# Patient Record
Sex: FEMALE | Race: WHITE | NOT HISPANIC OR LATINO | Employment: OTHER | URBAN - METROPOLITAN AREA
[De-identification: names, ages, dates, MRNs, and addresses within clinical notes are randomized per-mention and may not be internally consistent; named-entity substitution may affect disease eponyms.]

---

## 2017-05-01 ENCOUNTER — HOSPITAL ENCOUNTER (OUTPATIENT)
Dept: PAIN MEDICINE | Facility: HOSPITAL | Age: 57
Discharge: HOME | End: 2017-05-01
Attending: PAIN MEDICINE | Admitting: PAIN MEDICINE

## 2017-05-15 ENCOUNTER — HOSPITAL ENCOUNTER (OUTPATIENT)
Dept: PAIN MEDICINE | Facility: HOSPITAL | Age: 57
Discharge: HOME | End: 2017-05-15
Attending: PAIN MEDICINE | Admitting: PAIN MEDICINE

## 2017-06-05 ENCOUNTER — HOSPITAL ENCOUNTER (OUTPATIENT)
Dept: PAIN MEDICINE | Facility: HOSPITAL | Age: 57
Discharge: HOME | End: 2017-06-05
Attending: PAIN MEDICINE | Admitting: PAIN MEDICINE

## 2017-08-28 ENCOUNTER — HOSPITAL ENCOUNTER (OUTPATIENT)
Dept: PAIN MEDICINE | Facility: HOSPITAL | Age: 57
Discharge: HOME | End: 2017-08-28
Attending: PAIN MEDICINE | Admitting: PAIN MEDICINE

## 2017-12-22 ENCOUNTER — HOSPITAL ENCOUNTER (OUTPATIENT)
Dept: GENERAL RADIOLOGY | Facility: HOSPITAL | Age: 57
Discharge: HOME | End: 2017-12-22
Attending: NEUROLOGICAL SURGERY | Admitting: NEUROLOGICAL SURGERY

## 2018-01-29 ENCOUNTER — HOSPITAL ENCOUNTER (OUTPATIENT)
Dept: GENERAL RADIOLOGY | Facility: HOSPITAL | Age: 58
Discharge: HOME | End: 2018-01-29
Attending: NEUROLOGICAL SURGERY | Admitting: NEUROLOGICAL SURGERY

## 2018-03-14 ENCOUNTER — HOSPITAL ENCOUNTER (OUTPATIENT)
Dept: GENERAL RADIOLOGY | Facility: HOSPITAL | Age: 58
Discharge: HOME | End: 2018-03-14
Attending: NEUROLOGICAL SURGERY | Admitting: NEUROLOGICAL SURGERY

## 2018-05-16 ENCOUNTER — HOSPITAL ENCOUNTER (OUTPATIENT)
Dept: GENERAL RADIOLOGY | Facility: HOSPITAL | Age: 58
Discharge: HOME | End: 2018-05-16
Attending: NEUROLOGICAL SURGERY | Admitting: NEUROLOGICAL SURGERY

## 2021-07-19 ENCOUNTER — ON CAMPUS - OUTPATIENT (OUTPATIENT)
Dept: URBAN - METROPOLITAN AREA HOSPITAL 77 | Facility: HOSPITAL | Age: 61
End: 2021-07-19
Payer: COMMERCIAL

## 2021-07-19 DIAGNOSIS — Z12.11 ENCOUNTER FOR SCREENING FOR MALIGNANT NEOPLASM OF COLON: ICD-10-CM

## 2021-07-19 DIAGNOSIS — D12.5 BENIGN NEOPLASM OF SIGMOID COLON: ICD-10-CM

## 2021-07-19 DIAGNOSIS — K62.1 RECTAL POLYP: ICD-10-CM

## 2021-07-19 PROCEDURE — 45385 COLONOSCOPY W/LESION REMOVAL: CPT | Mod: 33 | Performed by: INTERNAL MEDICINE

## 2025-04-09 ENCOUNTER — PATIENT ROUNDING (BHMG ONLY) (OUTPATIENT)
Dept: URGENT CARE | Facility: CLINIC | Age: 65
End: 2025-04-09
Payer: COMMERCIAL

## 2025-04-09 NOTE — ED NOTES
Thank you for letting us care for you in your recent visit to our urgent care center. We would love to hear about your experience with us. Was this the first time you have visited our location?    We’re always looking for ways to make our patients’ experiences even better. Do you have any recommendations on ways we may improve?     I appreciate you taking the time to respond. Please be on the lookout for a survey about your recent visit from [employer] via text or email. We would greatly appreciate if you could fill that out and turn it back in. We want your voice to be heard and we value your feedback.   Thank you for choosing [facility] for your healthcare needs.     Stacy Practice Manager

## 2025-04-24 ENCOUNTER — OFFICE VISIT (OUTPATIENT)
Age: 65
End: 2025-04-24
Payer: COMMERCIAL

## 2025-04-24 VITALS
BODY MASS INDEX: 27.42 KG/M2 | HEART RATE: 70 BPM | OXYGEN SATURATION: 98 % | RESPIRATION RATE: 18 BRPM | HEIGHT: 62 IN | WEIGHT: 149 LBS

## 2025-04-24 DIAGNOSIS — M25.571 ACUTE RIGHT ANKLE PAIN: Primary | ICD-10-CM

## 2025-04-24 DIAGNOSIS — M25.371 ANKLE INSTABILITY, RIGHT: ICD-10-CM

## 2025-04-24 DIAGNOSIS — M20.5X1 HALLUX LIMITUS, RIGHT: ICD-10-CM

## 2025-04-24 DIAGNOSIS — S93.401A SEVERE ANKLE SPRAIN, RIGHT, INITIAL ENCOUNTER: ICD-10-CM

## 2025-04-24 RX ORDER — METHYLPREDNISOLONE 4 MG/1
TABLET ORAL
Qty: 21 TABLET | Refills: 0 | Status: SHIPPED | OUTPATIENT
Start: 2025-04-24

## 2025-05-08 ENCOUNTER — OFFICE VISIT (OUTPATIENT)
Age: 65
End: 2025-05-08
Payer: MEDICARE

## 2025-05-08 VITALS — WEIGHT: 149 LBS | HEIGHT: 62 IN | RESPIRATION RATE: 20 BRPM | BODY MASS INDEX: 27.42 KG/M2

## 2025-05-08 DIAGNOSIS — S93.401D SEVERE ANKLE SPRAIN, RIGHT, SUBSEQUENT ENCOUNTER: ICD-10-CM

## 2025-05-08 DIAGNOSIS — M20.11 HALLUX VALGUS (ACQUIRED), RIGHT FOOT: ICD-10-CM

## 2025-05-08 DIAGNOSIS — M25.571 ACUTE RIGHT ANKLE PAIN: Primary | ICD-10-CM

## 2025-05-08 DIAGNOSIS — M25.371 ANKLE INSTABILITY, RIGHT: ICD-10-CM

## 2025-05-08 NOTE — PROGRESS NOTES
05/08/2025  Foot and Ankle Surgery - Established Patient/Follow-up  Provider: Dr. Soy Root DPM  Location: [facility]    Subjective:  Katelin Farley is a 64 y.o. female.     Chief Complaint   Patient presents with    Right Ankle - Follow-up, Pain     DOI- 3/29/2025    Right Foot - Pain, Follow-up    Initial Evaluation     PCP: Skip Terrell MD  Last PCP Visit:   4/8/25       History of Present Illness  The patient presents for evaluation of ankle pain, bunion deformity, and thickened toenails.    She reports a gradual improvement in her condition, with the resolution of ankle pain. However, she experiences intermittent discomfort, particularly when wearing heeled footwear. She has been utilizing a boot for the past 10 days.    Additionally, she notes an increase in thickness of her toenails, which she has been managing by filing them down.    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    FAMILY HISTORY  Her daughter has bunions.    ALLERGIES  The patient is allergic to a lot of CHEMICALS.      Allergies   Allergen Reactions    Adhesive Tape Rash     Derma dressing after biopsy caused blisters       Current Outpatient Medications on File Prior to Visit   Medication Sig Dispense Refill    albuterol sulfate  (90 Base) MCG/ACT inhaler albuterol sulfate HFA 90 mcg/actuation aerosol inhaler   Inhale 2 puffs every 4 hours by inhalation route as needed.      betamethasone valerate (VALISONE) 0.1 % ointment Apply 1 Application topically to the appropriate area as directed Daily.      ipratropium-albuterol (DUO-NEB) 0.5-2.5 mg/3 ml nebulizer ipratropium 0.5 mg-albuterol 3 mg (2.5 mg base)/3 mL nebulization soln      PARoxetine (PAXIL) 10 MG tablet paroxetine 10 mg tablet      pramipexole (MIRAPEX) 0.5 MG tablet Take 3 tablets by mouth 3 (Three) Times a Day.      rizatriptan (MAXALT) 10 MG tablet rizatriptan 10 mg tablet      [DISCONTINUED] methylPREDNISolone (MEDROL) 4 MG  "dose pack Take as directed 21 tablet 0     No current facility-administered medications on file prior to visit.       Objective   Resp 20   Ht 157.5 cm (62\")   Wt 67.6 kg (149 lb)   BMI 27.25 kg/m²     Foot/Ankle Exam  Physical Exam  GENERAL  Appearance:  appears stated age  Orientation:  AAOx3  Affect:  appropriate     VASCULAR      Right Foot Vascularity   Dorsalis pedis:  2+  Posterior tibial:  2+  Skin temperature:  warm  Edema gradin+  CFT:  < 3 seconds  Pedal hair growth:  Absent     NEUROLOGIC      Right Foot Neurologic   Light touch sensation: normal  Hot/Cold sensation: normal  Achilles reflex:  2+     MUSCULOSKELETAL      Right Foot Musculoskeletal   Ecchymosis:  none  Tenderness:  ankle joint tenderness, lateral malleolus tenderness and peroneal tendon tenderness    Arch:  Normal  Hallux limitus: Yes       DERMATOLOGIC       Right Foot Dermatologic   Skin  Right foot skin is intact.      Right foot additional comments: Range of motion, muscle strength testing deferred secondary to guarding.  Mild bony spur noted to the dorsal aspect of the first metatarsal phalangeal joint with hallux limitus.  Moderate swelling involving the ankle with instability noted with anterior drawer and talar tilt testing.    25: Improvement noted to the right ankle with decreased discomfort.  Continued swelling about the ankle.  No prominent discomfort involving the first metatarsal phalangeal joint region.      Results  Imaging  X-ray shows a moderate bunion deformity and minimal arthritis on the top of the toe.    Assessment & Plan   Diagnoses and all orders for this visit:    1. Acute right ankle pain (Primary)  -     XR Ankle 3+ View Right    2. Severe ankle sprain, right, subsequent encounter    3. Ankle instability, right    4. Hallux valgus (acquired), right foot  -     XR Foot 3+ View Right      Assessment & Plan    The patient's ankle pain has shown improvement, but swelling and remodeling are expected to " persist. She is advised to continue with ankle range of motion exercises, including up and down circles in both directions. Supportive footwear, such as tennis shoes, is recommended to aid in the recovery process. 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 She should gradually wean off the brace over time and use good tennis shoes around the house. If she experiences more issues, she should inform the clinic.    The patient has a moderate bunion deformity causing intermittent discomfort, especially when wearing heels. The use of a compression sock or sleeve is suggested to manage daily swelling. She is encouraged to maintain ankle range of motion exercises and gradually resume her usual activities. Caution is advised when navigating uneven terrain or increasing activity levels. If the bunion becomes progressively bothersome or causes significant pain, surgical intervention may be considered. She is also advised to wear shoes with ample mesh in the toe box and good support, perform stretching exercises, and modify activities that exacerbate the condition. Arch inserts like Powersteps are recommended for additional support.    The thickening of her toenails is attributed to stress from contact with the shoe, rather than a fungal infection. She is reassured that filing down the thickened nails is acceptable.    Reviewed proper basic stretching and manual therapy exercises along with appropriate shoes and activity.  Discussed proper use and/or avoidance of OTC anti-inflammatories.  Patient is to call with any additional issues or concerns.  Greater than 20 minutes was spent before, during, and after evaluation for patient care.    The encounter note is created with the use of AI technology.  I do apologize if there are typos and/or confusion within the note.  Please feel free to contact me or my office with any questions or concerns.    Follow-up  The patient will follow  up in 4 weeks.             Patient or patient representative verbalized consent for the use of Ambient Listening during the visit with  TOBY Root DPM for chart documentation. 5/8/2025  12:51 EDT    TOBY Root DPM

## 2025-05-08 NOTE — PATIENT INSTRUCTIONS
PowerStep: [place] [employer]  - Full length insoles        Where to find:    Order online:  - [employer]    Local purchase:  - [employer]    [id number]                                                 [place] [address]

## 2025-06-05 ENCOUNTER — OFFICE VISIT (OUTPATIENT)
Age: 65
End: 2025-06-05
Payer: MEDICARE

## 2025-06-05 VITALS — WEIGHT: 149 LBS | HEIGHT: 62 IN | BODY MASS INDEX: 27.42 KG/M2 | RESPIRATION RATE: 20 BRPM

## 2025-06-05 DIAGNOSIS — M25.571 ACUTE RIGHT ANKLE PAIN: Primary | ICD-10-CM

## 2025-06-05 DIAGNOSIS — S93.401D SEVERE ANKLE SPRAIN, RIGHT, SUBSEQUENT ENCOUNTER: ICD-10-CM

## 2025-06-05 DIAGNOSIS — M20.11 HALLUX VALGUS (ACQUIRED), RIGHT FOOT: ICD-10-CM

## 2025-06-05 DIAGNOSIS — M25.371 ANKLE INSTABILITY, RIGHT: ICD-10-CM

## 2025-06-05 RX ORDER — FLUTICASONE PROPIONATE AND SALMETEROL 250; 50 UG/1; UG/1
1 POWDER RESPIRATORY (INHALATION)
COMMUNITY
Start: 2025-06-04

## 2025-06-05 RX ORDER — FLUTICASONE PROPIONATE 50 MCG
1 SPRAY, SUSPENSION (ML) NASAL DAILY
COMMUNITY
Start: 2025-06-04

## 2025-06-06 NOTE — PROGRESS NOTES
"06/05/2025  Foot and Ankle Surgery - Established Patient/Follow-up  Provider: Dr. Soy Root DPM  Location: [facility]    Subjective:  Katelin Farley is a 65 y.o. female.     Chief Complaint   Patient presents with    Right Ankle - Follow-up, Pain     DOI- 3/29/2025    Right Foot - Follow-up, Pain    Follow-up     PCP: Skip Terrell MD  Last PCP Visit: 4/15/24       History of Present Illness  The patient presents for evaluation of ankle pain.    She reports an improvement in her condition, with a noticeable reduction in swelling. She has resumed her outdoor work activities without any significant issues.      Allergies   Allergen Reactions    Adhesive Tape Rash     Derma dressing after biopsy caused blisters       Current Outpatient Medications on File Prior to Visit   Medication Sig Dispense Refill    albuterol sulfate  (90 Base) MCG/ACT inhaler albuterol sulfate HFA 90 mcg/actuation aerosol inhaler   Inhale 2 puffs every 4 hours by inhalation route as needed.      betamethasone valerate (VALISONE) 0.1 % ointment Apply 1 Application topically to the appropriate area as directed Daily.      fluticasone (FLONASE) 50 MCG/ACT nasal spray Administer 1 spray into the nostril(s) as directed by provider Daily.      Fluticasone-Salmeterol (ADVAIR/WIXELA) 250-50 MCG/ACT DISKUS Inhale 1 puff.      ipratropium-albuterol (DUO-NEB) 0.5-2.5 mg/3 ml nebulizer ipratropium 0.5 mg-albuterol 3 mg (2.5 mg base)/3 mL nebulization soln      PARoxetine (PAXIL) 10 MG tablet paroxetine 10 mg tablet      pramipexole (MIRAPEX) 0.5 MG tablet Take 3 tablets by mouth 3 (Three) Times a Day.      rizatriptan (MAXALT) 10 MG tablet rizatriptan 10 mg tablet       No current facility-administered medications on file prior to visit.       Objective   Resp 20   Ht 157.5 cm (62\")   Wt 67.6 kg (149 lb)   BMI 27.25 kg/m²     Foot/Ankle Exam  Physical Exam  GENERAL  Appearance:  appears stated age  Orientation:  " AAOx3  Affect:  appropriate     VASCULAR      Right Foot Vascularity   Dorsalis pedis:  2+  Posterior tibial:  2+  Skin temperature:  warm  Edema gradin+  CFT:  < 3 seconds  Pedal hair growth:  Absent     NEUROLOGIC      Right Foot Neurologic   Light touch sensation: normal  Hot/Cold sensation: normal  Achilles reflex:  2+     MUSCULOSKELETAL      Right Foot Musculoskeletal   Ecchymosis:  none  Tenderness:  ankle joint tenderness, lateral malleolus tenderness and peroneal tendon tenderness    Arch:  Normal  Hallux limitus: Yes       DERMATOLOGIC       Right Foot Dermatologic   Skin  Right foot skin is intact.      Right foot additional comments: Range of motion, muscle strength testing deferred secondary to guarding.  Mild bony spur noted to the dorsal aspect of the first metatarsal phalangeal joint with hallux limitus.  Moderate swelling involving the ankle with instability noted with anterior drawer and talar tilt testing.     25: Improvement noted to the right ankle with decreased discomfort.  Continued swelling about the ankle.  No prominent discomfort involving the first metatarsal phalangeal joint region.    25: Continued improvement.  No significant pain with palpation involving the ankle.  Mild swelling remains.  Mild instability present but unchanged.  No progressive deformity.      Results      Assessment & Plan   Diagnoses and all orders for this visit:    1. Acute right ankle pain (Primary)    2. Severe ankle sprain, right, subsequent encounter    3. Ankle instability, right    4. Hallux valgus (acquired), right foot      Assessment & Plan    She is demonstrating a positive response to the current treatment regimen, which aligns with the anticipated progression of her condition. She was advised to continue wearing supportive footwear, utilizing inserts, and engaging in regular stretching exercises. Caution was given regarding potential ankle laxity or weakness, which could increase  susceptibility to further injuries. She was counseled to exercise caution on uneven terrain and during strenuous activities. She was also advised against jumping off elevated surfaces and walking backwards. If her condition deteriorates, she should contact the office immediately.             Patient or patient representative verbalized consent for the use of Ambient Listening during the visit with  TOBY Root DPM for chart documentation. 6/6/2025  09:37 EDT    TOBY Root DPM

## 2025-07-03 ENCOUNTER — HOSPITAL ENCOUNTER (EMERGENCY)
Facility: HOSPITAL | Age: 65
Discharge: HOME | End: 2025-07-03
Payer: MEDICARE

## 2025-07-03 ENCOUNTER — APPOINTMENT (OUTPATIENT)
Dept: CT IMAGING | Facility: HOSPITAL | Age: 65
End: 2025-07-03
Payer: MEDICARE

## 2025-07-03 VITALS
DIASTOLIC BLOOD PRESSURE: 84 MMHG | RESPIRATION RATE: 15 BRPM | HEIGHT: 62 IN | OXYGEN SATURATION: 96 % | WEIGHT: 161.6 LBS | SYSTOLIC BLOOD PRESSURE: 131 MMHG | TEMPERATURE: 97.9 F | HEART RATE: 72 BPM | BODY MASS INDEX: 29.74 KG/M2

## 2025-07-03 DIAGNOSIS — K57.92 DIVERTICULITIS: Primary | ICD-10-CM

## 2025-07-03 DIAGNOSIS — R10.32 LEFT LOWER QUADRANT ABDOMINAL PAIN: ICD-10-CM

## 2025-07-03 LAB
ALBUMIN SERPL-MCNC: 4.3 G/DL (ref 3.5–5.2)
ALBUMIN/GLOB SERPL: 1.7 G/DL
ALP SERPL-CCNC: 68 U/L (ref 39–117)
ALT SERPL W P-5'-P-CCNC: 29 U/L (ref 1–33)
ANION GAP SERPL CALCULATED.3IONS-SCNC: 9.2 MMOL/L (ref 5–15)
AST SERPL-CCNC: 22 U/L (ref 1–32)
BACTERIA UR QL AUTO: NORMAL /HPF
BASOPHILS # BLD AUTO: 0.02 10*3/MM3 (ref 0–0.2)
BASOPHILS NFR BLD AUTO: 0.3 % (ref 0–1.5)
BILIRUB SERPL-MCNC: 0.6 MG/DL (ref 0–1.2)
BILIRUB UR QL STRIP: NEGATIVE
BUN SERPL-MCNC: 15.5 MG/DL (ref 8–23)
BUN/CREAT SERPL: 18.2 (ref 7–25)
CALCIUM SPEC-SCNC: 9.7 MG/DL (ref 8.6–10.5)
CHLORIDE SERPL-SCNC: 107 MMOL/L (ref 98–107)
CLARITY UR: CLEAR
CO2 SERPL-SCNC: 23.8 MMOL/L (ref 22–29)
COLOR UR: YELLOW
CREAT SERPL-MCNC: 0.85 MG/DL (ref 0.57–1)
DEPRECATED RDW RBC AUTO: 44 FL (ref 37–54)
EGFRCR SERPLBLD CKD-EPI 2021: 76.1 ML/MIN/1.73
EOSINOPHIL # BLD AUTO: 0.16 10*3/MM3 (ref 0–0.4)
EOSINOPHIL NFR BLD AUTO: 2.2 % (ref 0.3–6.2)
ERYTHROCYTE [DISTWIDTH] IN BLOOD BY AUTOMATED COUNT: 12.6 % (ref 12.3–15.4)
GLOBULIN UR ELPH-MCNC: 2.5 GM/DL
GLUCOSE SERPL-MCNC: 116 MG/DL (ref 65–99)
GLUCOSE UR STRIP-MCNC: NEGATIVE MG/DL
HCT VFR BLD AUTO: 39.7 % (ref 34–46.6)
HGB BLD-MCNC: 12.6 G/DL (ref 12–15.9)
HGB UR QL STRIP.AUTO: ABNORMAL
HOLD SPECIMEN: NORMAL
HYALINE CASTS UR QL AUTO: NORMAL /LPF
IMM GRANULOCYTES # BLD AUTO: 0.02 10*3/MM3 (ref 0–0.05)
IMM GRANULOCYTES NFR BLD AUTO: 0.3 % (ref 0–0.5)
KETONES UR QL STRIP: ABNORMAL
LEUKOCYTE ESTERASE UR QL STRIP.AUTO: NEGATIVE
LIPASE SERPL-CCNC: 51 U/L (ref 13–60)
LYMPHOCYTES # BLD AUTO: 1.37 10*3/MM3 (ref 0.7–3.1)
LYMPHOCYTES NFR BLD AUTO: 18.9 % (ref 19.6–45.3)
MCH RBC QN AUTO: 30.1 PG (ref 26.6–33)
MCHC RBC AUTO-ENTMCNC: 31.7 G/DL (ref 31.5–35.7)
MCV RBC AUTO: 95 FL (ref 79–97)
MONOCYTES # BLD AUTO: 0.57 10*3/MM3 (ref 0.1–0.9)
MONOCYTES NFR BLD AUTO: 7.9 % (ref 5–12)
NEUTROPHILS NFR BLD AUTO: 5.12 10*3/MM3 (ref 1.7–7)
NEUTROPHILS NFR BLD AUTO: 70.4 % (ref 42.7–76)
NITRITE UR QL STRIP: NEGATIVE
NRBC BLD AUTO-RTO: 0 /100 WBC (ref 0–0.2)
PH UR STRIP.AUTO: <=5 [PH] (ref 5–8)
PLATELET # BLD AUTO: 257 10*3/MM3 (ref 140–450)
PMV BLD AUTO: 9.9 FL (ref 6–12)
POTASSIUM SERPL-SCNC: 4.2 MMOL/L (ref 3.5–5.2)
PROT SERPL-MCNC: 6.8 G/DL (ref 6–8.5)
PROT UR QL STRIP: NEGATIVE
RBC # BLD AUTO: 4.18 10*6/MM3 (ref 3.77–5.28)
RBC # UR STRIP: NORMAL /HPF
REF LAB TEST METHOD: NORMAL
SODIUM SERPL-SCNC: 140 MMOL/L (ref 136–145)
SP GR UR STRIP: 1.02 (ref 1–1.03)
SQUAMOUS #/AREA URNS HPF: NORMAL /HPF
UROBILINOGEN UR QL STRIP: ABNORMAL
WBC # UR STRIP: NORMAL /HPF
WBC NRBC COR # BLD AUTO: 7.26 10*3/MM3 (ref 3.4–10.8)
WHOLE BLOOD HOLD COAG: NORMAL

## 2025-07-03 PROCEDURE — 80053 COMPREHEN METABOLIC PANEL: CPT

## 2025-07-03 PROCEDURE — 81001 URINALYSIS AUTO W/SCOPE: CPT

## 2025-07-03 PROCEDURE — 99285 EMERGENCY DEPT VISIT HI MDM: CPT

## 2025-07-03 PROCEDURE — 25510000001 IOPAMIDOL PER 1 ML

## 2025-07-03 PROCEDURE — 85025 COMPLETE CBC W/AUTO DIFF WBC: CPT

## 2025-07-03 PROCEDURE — 83690 ASSAY OF LIPASE: CPT

## 2025-07-03 PROCEDURE — 74177 CT ABD & PELVIS W/CONTRAST: CPT

## 2025-07-03 RX ORDER — IOPAMIDOL 755 MG/ML
100 INJECTION, SOLUTION INTRAVASCULAR
Status: COMPLETED | OUTPATIENT
Start: 2025-07-03 | End: 2025-07-03

## 2025-07-03 RX ADMIN — IOPAMIDOL 100 ML: 755 INJECTION, SOLUTION INTRAVENOUS at 14:00

## 2025-07-03 NOTE — ED PROVIDER NOTES
Subjective   History of Present Illness  Patient is a 65-year-old female with PMH of asthma, COPD presenting to the ED for left lower quadrant abdominal pain has been ongoing for the past 3 days.  Patient states she woke up Monday and has been having intermittent left lower quadrant abdominal pain that is now spreading into her left upper quadrant and left flank.  Patient reports intermittent pain rating it a 2 out of 10 currently, 10 out of 10 when up and walking.  Patient states she has not had a full bowel movement since Monday.  She states 2 days ago she did have a small amount of stool with mostly mucus, nonbloody.  She also reports intermittent nausea.  She denies any fever, chills, vomiting, diarrhea, dysuria, hematuria.  Patient had a colonoscopy about 1 year ago with no acute findings.  Patient has a history of a laparoscopic surgery for endometriosis and a total hysterectomy.        Review of Systems   Constitutional:  Negative for chills and fever.   Respiratory:  Negative for shortness of breath.    Cardiovascular:  Negative for chest pain.   Gastrointestinal:  Positive for abdominal pain, constipation and nausea. Negative for diarrhea and vomiting.   Genitourinary:  Negative for dysuria and hematuria.       Past Medical History:   Diagnosis Date    Asthma     Migraine     Seasonal allergies        Allergies   Allergen Reactions    Adhesive Tape Rash     Derma dressing after biopsy caused blisters       No past surgical history on file.    No family history on file.    Social History     Socioeconomic History    Marital status:    Tobacco Use    Smoking status: Never     Passive exposure: Never    Smokeless tobacco: Never   Vaping Use    Vaping status: Never Used   Substance and Sexual Activity    Alcohol use: Yes     Comment: OCCAS.    Drug use: Never    Sexual activity: Yes           Objective   Physical Exam  Constitutional:       Appearance: Normal appearance. She is well-developed.   HENT:     "  Head: Normocephalic and atraumatic.      Mouth/Throat:      Mouth: Mucous membranes are moist.   Eyes:      Extraocular Movements: Extraocular movements intact.   Cardiovascular:      Rate and Rhythm: Normal rate and regular rhythm.      Pulses: Normal pulses.      Heart sounds: Normal heart sounds.   Pulmonary:      Effort: Pulmonary effort is normal.      Breath sounds: Normal breath sounds.   Abdominal:      General: Abdomen is flat. Bowel sounds are normal.      Palpations: Abdomen is soft.      Tenderness: There is abdominal tenderness in the left upper quadrant and left lower quadrant. There is left CVA tenderness.   Musculoskeletal:         General: Normal range of motion.      Cervical back: Normal range of motion.      Right lower leg: No edema.      Left lower leg: No edema.   Skin:     General: Skin is warm and dry.      Capillary Refill: Capillary refill takes less than 2 seconds.   Neurological:      General: No focal deficit present.      Mental Status: She is alert and oriented to person, place, and time.   Psychiatric:         Mood and Affect: Mood normal.         Behavior: Behavior normal.         Procedures           ED Course      /84   Pulse 72   Temp 97.9 °F (36.6 °C)   Resp 15   Ht 157.5 cm (62\")   Wt 73.3 kg (161 lb 9.6 oz)   SpO2 96%   BMI 29.56 kg/m²   Labs Reviewed   COMPREHENSIVE METABOLIC PANEL - Abnormal; Notable for the following components:       Result Value    Glucose 116 (*)     All other components within normal limits    Narrative:     GFR Categories in Chronic Kidney Disease (CKD)              GFR Category          GFR (mL/min/1.73)    Interpretation  G1                    90 or greater        Normal or high (1)  G2                    60-89                Mild decrease (1)  G3a                   45-59                Mild to moderate decrease  G3b                   30-44                Moderate to severe decrease  G4                    15-29                Severe " decrease  G5                    14 or less           Kidney failure    (1)In the absence of evidence of kidney disease, neither GFR category G1 or G2 fulfill the criteria for CKD.    eGFR calculation 2021 CKD-EPI creatinine equation, which does not include race as a factor   URINALYSIS W/ CULTURE IF INDICATED - Abnormal; Notable for the following components:    Ketones, UA Trace (*)     Blood, UA Trace (*)     All other components within normal limits    Narrative:     In absence of clinical symptoms, the presence of pyuria, bacteria, and/or nitrites on the urinalysis result does not correlate with infection.   CBC WITH AUTO DIFFERENTIAL - Abnormal; Notable for the following components:    Lymphocyte % 18.9 (*)     All other components within normal limits   LIPASE - Normal   URINALYSIS, MICROSCOPIC ONLY   CBC AND DIFFERENTIAL    Narrative:     The following orders were created for panel order CBC & Differential.  Procedure                               Abnormality         Status                     ---------                               -----------         ------                     CBC Auto Differential[[id number]]        Abnormal            Final result                 Please view results for these tests on the individual orders.   EXTRA TUBES    Narrative:     The following orders were created for panel order Extra Tubes.  Procedure                               Abnormality         Status                     ---------                               -----------         ------                     Gold Top - SST[[id number]]                                   Final result               Light Blue Top[[id number]]                                   Final result                 Please view results for these tests on the individual orders.   GOLD TOP - SST   LIGHT BLUE TOP     Medications   iopamidol (ISOVUE-370) 76 % injection 100 mL (100 mL Intravenous Given 7/3/25 1400)     CT Abdomen Pelvis With Contrast  Result Date:  7/3/2025  Impression: 1.Acute diverticulitis involving the proximal sigmoid colon. No free air or abscess formation is identified. 2.Probable 3 cm hemangioma within the superior liver. Additional right hepatic lobe cyst noted. 3.Suspect hepatic steatosis. 4.Additional findings as detailed above. Electronically Signed: Devon Guzman MD  7/3/2025 2:21 PM EDT  Workstation ID: [id number]                                                     Medical Decision Making  Chart review: 7/3/25 Natadarcy Mcpherson [facility]: 65-year-old female with constipation and worsening abdominal pain for a couple of days. No significant bowel movement since Monday. Reports severe pain, especially when changing positions, and no over-the-counter medications taken.  Final Assessment:  Patient presents with constipation and severe, worsening abdominal pain. Advised to seek immediate evaluation at the ER for potential partial bowel obstruction or other underlying issues.    Patient presented to the ED for the above complaint.    Patient underwent the above exam and evaluation.    While in the ED patient was placed in a gown and IV was established.  CT abdomen pelvis and blood work was obtained to assess for diverticulitis, obstruction, abscess, perforation, electrolyte normality, dehydration.  Patient was offered pain medication, declined.  Upon reevaluation patient resting comfortably.  Discussed findings with patient.  Discussed outpatient versus inpatient antibiotic treatment, patient preferring to be treated outpatient, agreeable with this plan.  She will be discharged here with antibiotics.  Educated patient to take antibiotic as prescribed until complete, increase fluid intake, follow clear liquid diet and increase as tolerated, follow-up close with PCP, follow-up with GI, and strict return precautions were discussed.  Patient voiced understanding, agreeable dispo plan.  Patient ambulating independently without difficulty at time of  discharge.    Labs were independently interpreted by myself and deemed remarkable for the following: No leukocytosis, hemoglobin stable at 12.6, no electrolyte abnormalities, urinalysis negative for hematuria bacteria or pyuria.  CT abdomen pelvis independently interpreted by Dr. Roberts and reviewed by myself showing: Acute diverticulitis in proximal sigmoid colon without abscess or perforation, probable 3 cm hemangioma within superior liver, no other acute findings.    Appropriate PPE was worn during each patient encounter.    Note Disclaimer: At [facility], we believe that sharing information builds trust and better relationships. You are receiving this note because you are receiving care at [facility] or recently visited. It is possible you will see health information before a provider has talked with you about it. This kind of information can be easy to misunderstand. To help you fully understand what it means for your health, we urge you to discuss this note with your provider.    Discussed this patient with Dr. Roberts who agrees with plan.      Problems Addressed:  Diverticulitis: complicated acute illness or injury  Left lower quadrant abdominal pain: complicated acute illness or injury    Amount and/or Complexity of Data Reviewed  Labs: ordered.  Radiology: ordered.    Risk  Prescription drug management.        Final diagnoses:   Diverticulitis   Left lower quadrant abdominal pain       ED Disposition  ED Disposition       ED Disposition   Discharge    Condition   Stable    Comment   --               Skip Terrell MD  [address] [address]  [phone]    Schedule an appointment as soon as possible for a visit       [facility]  [address] [address] [phone]  Schedule an appointment as soon as possible for a visit            Medication List        New Prescriptions      amoxicillin-clavulanate 875-125 MG  per tablet  Commonly known as: AUGMENTIN  Take 1 tablet by mouth 2 (Two) Times a Day for 7 days.               Where to Get Your Medications        These medications were sent to [facility] [id number] - [place], [place] - [address] - [phone]  - [phone] FX  [address] [place] [address]      Phone: [phone]   amoxicillin-clavulanate 875-125 MG per tablet            Janina Haynes PA-C  07/03/25 [id number]

## 2025-07-03 NOTE — DISCHARGE INSTRUCTIONS
Take antibiotic as prescribed until complete.  Increase fluid intake.  Consume clear liquid diet and increase as tolerated.  Follow diet suggestions and handout provided.    Follow-up closely with PCP.    Follow-up with GI.    Return to the ED for any new or worsening symptoms.

## 2025-07-03 NOTE — ED NOTES
Pt reports left sided abdominal pain that started 4 days ago - pt also reports no BM since Monday. Reports nausea but no vomiting.

## 2025-08-05 ENCOUNTER — TRANSCRIBE ORDERS (OUTPATIENT)
Dept: ADMINISTRATIVE | Facility: HOSPITAL | Age: 65
End: 2025-08-05
Payer: MEDICARE

## 2025-08-05 DIAGNOSIS — K57.92 DVTRCLI OF INTEST, PART UNSP, W/O PERF OR ABSCESS W/O BLEED: Primary | ICD-10-CM

## 2025-08-18 ENCOUNTER — HOSPITAL ENCOUNTER (OUTPATIENT)
Dept: PET IMAGING | Facility: HOSPITAL | Age: 65
Discharge: HOME | End: 2025-08-18
Admitting: INTERNAL MEDICINE
Payer: MEDICARE

## 2025-08-18 DIAGNOSIS — K57.92 DVTRCLI OF INTEST, PART UNSP, W/O PERF OR ABSCESS W/O BLEED: ICD-10-CM

## 2025-08-18 PROCEDURE — 25510000001 IOPAMIDOL PER 1 ML: Performed by: INTERNAL MEDICINE

## 2025-08-18 PROCEDURE — 74177 CT ABD & PELVIS W/CONTRAST: CPT

## 2025-08-18 RX ORDER — IOPAMIDOL 755 MG/ML
100 INJECTION, SOLUTION INTRAVASCULAR
Status: COMPLETED | OUTPATIENT
Start: 2025-08-18 | End: 2025-08-18

## 2025-08-18 RX ADMIN — IOPAMIDOL 100 ML: 755 INJECTION, SOLUTION INTRAVENOUS at 14:18
